# Patient Record
Sex: FEMALE | Race: OTHER | NOT HISPANIC OR LATINO | URBAN - METROPOLITAN AREA
[De-identification: names, ages, dates, MRNs, and addresses within clinical notes are randomized per-mention and may not be internally consistent; named-entity substitution may affect disease eponyms.]

---

## 2022-12-08 ENCOUNTER — EMERGENCY (EMERGENCY)
Facility: HOSPITAL | Age: 27
LOS: 1 days | Discharge: LEFT BEFORE DISCHARGE | End: 2022-12-08
Attending: EMERGENCY MEDICINE | Admitting: EMERGENCY MEDICINE
Payer: COMMERCIAL

## 2022-12-08 VITALS
TEMPERATURE: 98 F | HEART RATE: 73 BPM | WEIGHT: 160.06 LBS | OXYGEN SATURATION: 100 % | SYSTOLIC BLOOD PRESSURE: 117 MMHG | RESPIRATION RATE: 16 BRPM | HEIGHT: 63 IN | DIASTOLIC BLOOD PRESSURE: 79 MMHG

## 2022-12-08 DIAGNOSIS — Z20.822 CONTACT WITH AND (SUSPECTED) EXPOSURE TO COVID-19: ICD-10-CM

## 2022-12-08 DIAGNOSIS — R53.83 OTHER FATIGUE: ICD-10-CM

## 2022-12-08 DIAGNOSIS — Z53.29 PROCEDURE AND TREATMENT NOT CARRIED OUT BECAUSE OF PATIENT'S DECISION FOR OTHER REASONS: ICD-10-CM

## 2022-12-08 DIAGNOSIS — R51.9 HEADACHE, UNSPECIFIED: ICD-10-CM

## 2022-12-08 DIAGNOSIS — R59.1 GENERALIZED ENLARGED LYMPH NODES: ICD-10-CM

## 2022-12-08 LAB
FLUAV SUBTYP SPEC NAA+PROBE: SIGNIFICANT CHANGE UP
FLUBV RNA SPEC QL NAA+PROBE: SIGNIFICANT CHANGE UP
RAPID RVP RESULT: SIGNIFICANT CHANGE UP
RSV RNA SPEC QL NAA+PROBE: SIGNIFICANT CHANGE UP
SARS-COV-2 RNA SPEC QL NAA+PROBE: SIGNIFICANT CHANGE UP

## 2022-12-08 PROCEDURE — 99284 EMERGENCY DEPT VISIT MOD MDM: CPT

## 2022-12-08 NOTE — ED PROVIDER NOTE - CLINICAL SUMMARY MEDICAL DECISION MAKING FREE TEXT BOX
Patient with submandibular lymphadenopathy mild. Possible mono. Will order mono test, patient agreed and then walked out of ED. Test not performed.

## 2022-12-08 NOTE — ED PROVIDER NOTE - EYES, MLM
Clear bilaterally, pupils equal, round and reactive to light. Neck: parotid gland WNL, no tongue elevation, + submandibular lymph nodes

## 2022-12-08 NOTE — ED PROVIDER NOTE - OBJECTIVE STATEMENT
28 yo F, denies pmhx, sent from  for evaluation of swollen glands. Patient says for the past 3 days she has felt fatigued and noticed her glands under her chin were swollen. Denies sore throat. Denies sinus pain or pressure, dental issues or pain. Denies ambdominal pain.

## 2022-12-08 NOTE — ED ADULT NURSE NOTE - OBJECTIVE STATEMENT
Pt with bilateral swollen gland to throat x4 days, pt with fatigue for 4 days as well. Tenderness on palpation, no sore throat, no fevers, no n/v, no swallowing complaints, etc. Pt only reports fatigue.

## 2022-12-08 NOTE — ED ADULT TRIAGE NOTE - CHIEF COMPLAINT QUOTE
Pt sent from  for eval of neck swelling. Pt reports 4 days of worsening parotid/neck swelling and fatigue. Reports troat discomfort but no difficulty breathing. Afebrile, vaccinated for mumps.

## 2022-12-08 NOTE — ED ADULT NURSE NOTE - HOW OFTEN DO YOU HAVE A DRINK CONTAINING ALCOHOL?
H&P reviewed  After examining the patient I find no changes in the patients condition since the H&P had been written    /58   Pulse 75   Temp 98 1 °F (36 7 °C) (Oral)   Resp 18   LMP 06/26/2019 (Approximate)   SpO2 96% Never